# Patient Record
Sex: MALE | Race: WHITE | NOT HISPANIC OR LATINO | URBAN - METROPOLITAN AREA
[De-identification: names, ages, dates, MRNs, and addresses within clinical notes are randomized per-mention and may not be internally consistent; named-entity substitution may affect disease eponyms.]

---

## 2018-02-06 VITALS
DIASTOLIC BLOOD PRESSURE: 84 MMHG | SYSTOLIC BLOOD PRESSURE: 131 MMHG | HEART RATE: 93 BPM | HEIGHT: 67 IN | OXYGEN SATURATION: 96 % | WEIGHT: 216.93 LBS | RESPIRATION RATE: 18 BRPM

## 2018-02-06 RX ORDER — CHLORHEXIDINE GLUCONATE 213 G/1000ML
1 SOLUTION TOPICAL ONCE
Qty: 0 | Refills: 0 | Status: DISCONTINUED | OUTPATIENT
Start: 2018-02-07 | End: 2018-02-07

## 2018-02-06 NOTE — H&P ADULT - ASSESSMENT
72 y.o Male with PMHx of HTN, Hyperlipidemia, NIDDM, DG on CPAP @ nightly, recently dx'd with Afib in 12/2017 started on Xarelto (last dose on 02/04/18), who was subsequently referred to  Dr. Hooks  for further cardiac workup  and underwent an abnormal stress Echo.  He presents today for recommended Cardiac Cath with possible intervention if clinically indicated to r/o an ischemic etiology of new onset Afib.        ASA:     Mallampati: 72 yr old M with PMHx of HTN, Hyperlipidemia, NIDDM, DG on CPAP, recently dx'd with Afib in 12/2017 started on Xarelto (last dose on 02/04/18), who was subsequently referred to  Dr. Hooks  for further cardiac workup  and underwent an abnormal stress Echo.  He presents today for recommended Cardiac Cath with possible intervention if clinically indicated to r/o an ischemic etiology of new onset Afib.        ASA: III    Mallampati: II           Risks & benefits of procedure and alternative therapy have been explained to the patient including but not limited to: allergic reaction, bleeding w/possible need for blood transfusion, infection, renal and vascular compromise, limb damage, arrhythmia, stroke, vessel dissection/perforation, Myocardial infarction, emergent CABG. Informed consent obtained and in chart.

## 2018-02-06 NOTE — H&P ADULT - HISTORY OF PRESENT ILLNESS
72 y.o Male with PMHx of HTN, Hyperlipidemia,         who presented to Dr. Burkett c/o ***HISTORY OBTAINED FROM PATIENT"S WIFE ALONDRA BELLE -RELIABLE HISTORIAN     72 y.o Male with PMHx of HTN, Hyperlipidemia, NIDDM, GD on CPAP @ nightly, recently dx'd with Afib in 12/2017  started on Xarelto (last dose on 02/04/18), who presented to Dr. Hooks  for  cardiac workup for  new onset  Afib.  Pt's wife reports her  has been felling well.  He is able to walk "several" blocks without any limitations or any anginal symptoms.  He denies experiencing any CP,  SOB, palpitations, dizziness, syncope, recent PND/orthopnea, LE edema, or decline in his overall exercise tolerance.   Stress Echo performed on 01/06/18 revealed inferolateral ischemia, LVEF of 60-65%.    Per MD note, pt reported marked ORTA  on the treadmill and could only walk less than 3 minutes before having to stop due to marked dyspnea and chest pressure.      In light of pt's risk factors, above CCS Anginal Class 2 symptoms, and an abnormal Stress Echo, pt is now referred to [facility] for recommended Cardiac Cath with possible intervention if clinically indicated to  r/o suspected underlying CAD. ***HISTORY OBTAINED FROM PATIENT"S WIFE ALONDRA BELLE -RELIABLE HISTORIAN     72 y.o Male with PMHx of HTN, Hyperlipidemia, NIDDM, DG on CPAP @ nightly, recently dx'd with Afib in 12/2017 started on Xarelto (last dose on 02/04/18), who was subsequently referred to  Dr. Hooks  for cardiac workup for  new onset  Afib.  Pt's wife reports her  has been felling well.  He is able to walk "several" blocks without any limitations or any anginal symptoms.  He denies experiencing any CP,  SOB, palpitations, dizziness, syncope, recent PND/orthopnea, LE edema, or decline in his overall exercise tolerance.   Stress Echo performed on 01/06/18 revealed inferolateral ischemia, LVEF of 60-65%.    Per MD note, pt reported marked ORTA  on the treadmill and could only walk less than 3 minutes before having to stop due to marked dyspnea and chest pressure.      In light of pt's risk factors, above CCS Anginal Class 2 symptoms, and an abnormal Stress Echo, pt is now referred to [facility] for recommended Cardiac Cath with possible intervention if clinically indicated to  r/o ischemic  etiology of new onset Afib. ***HISTORY OBTAINED FROM PATIENT"S WIFE ALONDRA BELLE -RELIABLE HISTORIAN     72 y.o Male with PMHx of HTN, Hyperlipidemia, NIDDM, DG on CPAP @ nightly, recently dx'd with Afib in 12/2017 started on Xarelto (last dose on 02/04/18), who was subsequently referred to  Dr. Hooks  for cardiac workup for  new onset  Afib.  Pt's wife reports her  has been felling well.  He is able to walk "several" blocks without any limitations or any anginal symptoms.  He denies experiencing any CP,  SOB, palpitations, dizziness, syncope, recent PND/orthopnea, LE edema, or decline in his overall exercise tolerance.   Stress Echo performed on 01/06/18 revealed inferolateral ischemia, LVEF of 60-65%.    Per MD note, pt reported marked ORTA  on the treadmill and could only walk less than 3 minutes before having to stop due to marked dyspnea and chest pressure.      In light of pt's risk factors, above CCS Anginal Class 2 symptoms, and an abnormal Stress Echo, pt is now referred to [facility] for recommended Cardiac Cath with possible intervention if clinically indicated to  r/o ischemic  etiology of new onset Afib.

## 2018-02-07 ENCOUNTER — OUTPATIENT (OUTPATIENT)
Dept: OUTPATIENT SERVICES | Facility: HOSPITAL | Age: 73
LOS: 1 days | Discharge: NURSING, REHAB OR LONG-TERM CARE | End: 2018-02-07
Payer: MEDICARE

## 2018-02-07 DIAGNOSIS — Z98.890 OTHER SPECIFIED POSTPROCEDURAL STATES: Chronic | ICD-10-CM

## 2018-02-07 DIAGNOSIS — I20.9 ANGINA PECTORIS, UNSPECIFIED: ICD-10-CM

## 2018-02-07 LAB
ALBUMIN SERPL ELPH-MCNC: 4.3 G/DL — SIGNIFICANT CHANGE UP (ref 3.3–5)
ALP SERPL-CCNC: 40 U/L — SIGNIFICANT CHANGE UP (ref 40–120)
ALT FLD-CCNC: 22 U/L — SIGNIFICANT CHANGE UP (ref 10–45)
ANION GAP SERPL CALC-SCNC: 14 MMOL/L — SIGNIFICANT CHANGE UP (ref 5–17)
APTT BLD: 28.6 SEC — SIGNIFICANT CHANGE UP (ref 27.5–37.4)
AST SERPL-CCNC: 21 U/L — SIGNIFICANT CHANGE UP (ref 10–40)
BASOPHILS NFR BLD AUTO: 0.4 % — SIGNIFICANT CHANGE UP (ref 0–2)
BILIRUB SERPL-MCNC: 0.4 MG/DL — SIGNIFICANT CHANGE UP (ref 0.2–1.2)
BUN SERPL-MCNC: 16 MG/DL — SIGNIFICANT CHANGE UP (ref 7–23)
CALCIUM SERPL-MCNC: 9.5 MG/DL — SIGNIFICANT CHANGE UP (ref 8.4–10.5)
CHLORIDE SERPL-SCNC: 98 MMOL/L — SIGNIFICANT CHANGE UP (ref 96–108)
CHOLEST SERPL-MCNC: 140 MG/DL — SIGNIFICANT CHANGE UP (ref 10–199)
CK MB CFR SERPL CALC: 2.4 NG/ML — SIGNIFICANT CHANGE UP (ref 0–6.7)
CO2 SERPL-SCNC: 26 MMOL/L — SIGNIFICANT CHANGE UP (ref 22–31)
CREAT SERPL-MCNC: 1.15 MG/DL — SIGNIFICANT CHANGE UP (ref 0.5–1.3)
CRP SERPL-MCNC: 0.18 MG/DL — SIGNIFICANT CHANGE UP (ref 0–0.4)
EOSINOPHIL NFR BLD AUTO: 3.1 % — SIGNIFICANT CHANGE UP (ref 0–6)
GLUCOSE SERPL-MCNC: 137 MG/DL — HIGH (ref 70–99)
HBA1C BLD-MCNC: 7.2 % — HIGH (ref 4–5.6)
HCT VFR BLD CALC: 40.7 % — SIGNIFICANT CHANGE UP (ref 39–50)
HDLC SERPL-MCNC: 48 MG/DL — SIGNIFICANT CHANGE UP (ref 40–125)
HGB BLD-MCNC: 13.7 G/DL — SIGNIFICANT CHANGE UP (ref 13–17)
INR BLD: 0.93 — SIGNIFICANT CHANGE UP (ref 0.88–1.16)
LIPID PNL WITH DIRECT LDL SERPL: 80 MG/DL — SIGNIFICANT CHANGE UP
LYMPHOCYTES # BLD AUTO: 30.4 % — SIGNIFICANT CHANGE UP (ref 13–44)
MCHC RBC-ENTMCNC: 30.9 PG — SIGNIFICANT CHANGE UP (ref 27–34)
MCHC RBC-ENTMCNC: 33.7 G/DL — SIGNIFICANT CHANGE UP (ref 32–36)
MCV RBC AUTO: 91.9 FL — SIGNIFICANT CHANGE UP (ref 80–100)
MONOCYTES NFR BLD AUTO: 5.9 % — SIGNIFICANT CHANGE UP (ref 2–14)
NEUTROPHILS NFR BLD AUTO: 60.2 % — SIGNIFICANT CHANGE UP (ref 43–77)
PLATELET # BLD AUTO: 237 K/UL — SIGNIFICANT CHANGE UP (ref 150–400)
POTASSIUM SERPL-MCNC: 4.4 MMOL/L — SIGNIFICANT CHANGE UP (ref 3.5–5.3)
POTASSIUM SERPL-SCNC: 4.4 MMOL/L — SIGNIFICANT CHANGE UP (ref 3.5–5.3)
PROT SERPL-MCNC: 7.2 G/DL — SIGNIFICANT CHANGE UP (ref 6–8.3)
PROTHROM AB SERPL-ACNC: 10.3 SEC — SIGNIFICANT CHANGE UP (ref 9.8–12.7)
RBC # BLD: 4.43 M/UL — SIGNIFICANT CHANGE UP (ref 4.2–5.8)
RBC # FLD: 12.7 % — SIGNIFICANT CHANGE UP (ref 10.3–16.9)
SODIUM SERPL-SCNC: 138 MMOL/L — SIGNIFICANT CHANGE UP (ref 135–145)
TOTAL CHOLESTEROL/HDL RATIO MEASUREMENT: 2.9 RATIO — LOW (ref 3.4–9.6)
TRIGL SERPL-MCNC: 58 MG/DL — SIGNIFICANT CHANGE UP (ref 10–149)
WBC # BLD: 7.2 K/UL — SIGNIFICANT CHANGE UP (ref 3.8–10.5)
WBC # FLD AUTO: 7.2 K/UL — SIGNIFICANT CHANGE UP (ref 3.8–10.5)

## 2018-02-07 PROCEDURE — 93005 ELECTROCARDIOGRAM TRACING: CPT

## 2018-02-07 PROCEDURE — 83036 HEMOGLOBIN GLYCOSYLATED A1C: CPT

## 2018-02-07 PROCEDURE — 93458 L HRT ARTERY/VENTRICLE ANGIO: CPT

## 2018-02-07 PROCEDURE — 80053 COMPREHEN METABOLIC PANEL: CPT

## 2018-02-07 PROCEDURE — 82550 ASSAY OF CK (CPK): CPT

## 2018-02-07 PROCEDURE — 85730 THROMBOPLASTIN TIME PARTIAL: CPT

## 2018-02-07 PROCEDURE — 80061 LIPID PANEL: CPT

## 2018-02-07 PROCEDURE — 86140 C-REACTIVE PROTEIN: CPT

## 2018-02-07 PROCEDURE — 36415 COLL VENOUS BLD VENIPUNCTURE: CPT

## 2018-02-07 PROCEDURE — 85610 PROTHROMBIN TIME: CPT

## 2018-02-07 PROCEDURE — C1887: CPT

## 2018-02-07 PROCEDURE — C1769: CPT

## 2018-02-07 PROCEDURE — 93010 ELECTROCARDIOGRAM REPORT: CPT

## 2018-02-07 PROCEDURE — 82962 GLUCOSE BLOOD TEST: CPT

## 2018-02-07 PROCEDURE — 82553 CREATINE MB FRACTION: CPT

## 2018-02-07 PROCEDURE — 85025 COMPLETE CBC W/AUTO DIFF WBC: CPT

## 2018-02-07 RX ORDER — SODIUM CHLORIDE 9 MG/ML
1000 INJECTION INTRAMUSCULAR; INTRAVENOUS; SUBCUTANEOUS
Qty: 0 | Refills: 0 | Status: DISCONTINUED | OUTPATIENT
Start: 2018-02-07 | End: 2018-02-07

## 2018-02-07 RX ORDER — SODIUM CHLORIDE 9 MG/ML
1000 INJECTION, SOLUTION INTRAVENOUS
Qty: 0 | Refills: 0 | Status: DISCONTINUED | OUTPATIENT
Start: 2018-02-07 | End: 2018-02-07

## 2018-02-07 RX ORDER — CLOPIDOGREL BISULFATE 75 MG/1
600 TABLET, FILM COATED ORAL ONCE
Qty: 0 | Refills: 0 | Status: DISCONTINUED | OUTPATIENT
Start: 2018-02-07 | End: 2018-02-07

## 2018-02-07 RX ORDER — SODIUM CHLORIDE 9 MG/ML
500 INJECTION INTRAMUSCULAR; INTRAVENOUS; SUBCUTANEOUS
Qty: 0 | Refills: 0 | Status: DISCONTINUED | OUTPATIENT
Start: 2018-02-07 | End: 2018-02-07

## 2018-02-07 RX ORDER — DEXTROSE 50 % IN WATER 50 %
1 SYRINGE (ML) INTRAVENOUS ONCE
Qty: 0 | Refills: 0 | Status: DISCONTINUED | OUTPATIENT
Start: 2018-02-07 | End: 2018-02-07

## 2018-02-07 RX ORDER — GLUCAGON INJECTION, SOLUTION 0.5 MG/.1ML
1 INJECTION, SOLUTION SUBCUTANEOUS ONCE
Qty: 0 | Refills: 0 | Status: DISCONTINUED | OUTPATIENT
Start: 2018-02-07 | End: 2018-02-07

## 2018-02-07 RX ORDER — INSULIN LISPRO 100/ML
VIAL (ML) SUBCUTANEOUS ONCE
Qty: 0 | Refills: 0 | Status: DISCONTINUED | OUTPATIENT
Start: 2018-02-07 | End: 2018-02-07

## 2018-02-07 RX ORDER — ASPIRIN/CALCIUM CARB/MAGNESIUM 324 MG
325 TABLET ORAL ONCE
Qty: 0 | Refills: 0 | Status: DISCONTINUED | OUTPATIENT
Start: 2018-02-07 | End: 2018-02-07

## 2018-02-07 RX ORDER — DEXTROSE 50 % IN WATER 50 %
12.5 SYRINGE (ML) INTRAVENOUS ONCE
Qty: 0 | Refills: 0 | Status: DISCONTINUED | OUTPATIENT
Start: 2018-02-07 | End: 2018-02-07

## 2018-02-07 RX ORDER — DEXTROSE 50 % IN WATER 50 %
25 SYRINGE (ML) INTRAVENOUS ONCE
Qty: 0 | Refills: 0 | Status: DISCONTINUED | OUTPATIENT
Start: 2018-02-07 | End: 2018-02-07

## 2018-02-07 RX ADMIN — SODIUM CHLORIDE 75 MILLILITER(S): 9 INJECTION INTRAMUSCULAR; INTRAVENOUS; SUBCUTANEOUS at 11:35

## 2018-02-07 NOTE — PROGRESS NOTE ADULT - SUBJECTIVE AND OBJECTIVE BOX
Interventional Cardiology PA SDA Discharge Note    No complaints.   Afebrile, VSS    Ext:    R Radial:   no hematoma, no bleeding, radial pulse 1+, radial band in place    A/P: 71 y/o M w/ PMHX HTN, HLD, DM, DG on CPAP, Afib on Xarelto, w/ abnormal stress echo and CCS Angina Class 2 Sx     Pt s/p cardiac cath today: non-obstructive CAD, EF 55%     Resume Xarelto on 2/8/18    Stable for discharge as per attending Dr. Hooks after bed rest, pt voids, wrist stable, and 30 min. after ambulation.  Follow-up with Dr. Hooks in 1 week  Discharge forms signed and copies in chart

## 2019-06-03 PROBLEM — I10 ESSENTIAL (PRIMARY) HYPERTENSION: Chronic | Status: ACTIVE | Noted: 2018-02-06

## 2019-06-03 PROBLEM — E11.9 TYPE 2 DIABETES MELLITUS WITHOUT COMPLICATIONS: Chronic | Status: ACTIVE | Noted: 2018-02-06

## 2019-06-03 PROBLEM — I48.91 UNSPECIFIED ATRIAL FIBRILLATION: Chronic | Status: ACTIVE | Noted: 2018-02-06

## 2019-07-07 PROBLEM — Z00.00 ENCOUNTER FOR PREVENTIVE HEALTH EXAMINATION: Status: ACTIVE | Noted: 2019-07-07

## 2019-11-15 ENCOUNTER — APPOINTMENT (OUTPATIENT)
Dept: HEART AND VASCULAR | Facility: CLINIC | Age: 74
End: 2019-11-15
Payer: MEDICARE

## 2019-11-15 ENCOUNTER — NON-APPOINTMENT (OUTPATIENT)
Age: 74
End: 2019-11-15

## 2019-11-15 VITALS
HEART RATE: 74 BPM | WEIGHT: 216 LBS | BODY MASS INDEX: 33.9 KG/M2 | HEIGHT: 67 IN | DIASTOLIC BLOOD PRESSURE: 68 MMHG | SYSTOLIC BLOOD PRESSURE: 135 MMHG

## 2019-11-15 PROCEDURE — 93000 ELECTROCARDIOGRAM COMPLETE: CPT

## 2019-11-15 PROCEDURE — 99205 OFFICE O/P NEW HI 60 MIN: CPT | Mod: 25

## 2019-11-15 RX ORDER — INSULIN DEGLUDEC INJECTION 100 U/ML
100 INJECTION, SOLUTION SUBCUTANEOUS DAILY
Qty: 1 | Refills: 0 | Status: ACTIVE | COMMUNITY
Start: 2019-11-15

## 2019-11-15 RX ORDER — METFORMIN HYDROCHLORIDE 850 MG/1
850 TABLET, COATED ORAL TWICE DAILY
Qty: 180 | Refills: 0 | Status: ACTIVE | COMMUNITY
Start: 2019-11-15

## 2019-11-15 RX ORDER — RIVAROXABAN 20 MG/1
20 TABLET, FILM COATED ORAL
Qty: 30 | Refills: 7 | Status: ACTIVE | COMMUNITY
Start: 2019-11-15

## 2019-11-15 RX ORDER — AMLODIPINE BESYLATE 10 MG/1
10 TABLET ORAL
Qty: 90 | Refills: 3 | Status: ACTIVE | COMMUNITY
Start: 2019-11-15

## 2019-11-15 RX ORDER — QUINAPRIL HYDROCHLORIDE 10 MG/1
10 TABLET, FILM COATED ORAL DAILY
Qty: 90 | Refills: 0 | Status: ACTIVE | COMMUNITY
Start: 2019-11-15

## 2019-11-15 NOTE — PHYSICAL EXAM
[General Appearance - Well Developed] : well developed [Well Groomed] : well groomed [Normal Appearance] : normal appearance [General Appearance - Well Nourished] : well nourished [No Deformities] : no deformities [General Appearance - In No Acute Distress] : no acute distress [Normal Conjunctiva] : the conjunctiva exhibited no abnormalities [Normal Oral Mucosa] : normal oral mucosa [No Oral Pallor] : no oral pallor [No Oral Cyanosis] : no oral cyanosis [Normal Jugular Venous A Waves Present] : normal jugular venous A waves present [Normal Jugular Venous V Waves Present] : normal jugular venous V waves present [No Jugular Venous Nugent A Waves] : no jugular venous nugent A waves [Normal] : normal [5th Left ICS - MCL] : palpated at the 5th LICS in the midclavicular line [Normal Rate] : normal [Normal S1] : normal S1 [Rhythm Regular] : regular [Normal S2] : normal S2 [No Pitting Edema] : no pitting edema present [Respiration, Rhythm And Depth] : normal respiratory rhythm and effort [Exaggerated Use Of Accessory Muscles For Inspiration] : no accessory muscle use [Auscultation Breath Sounds / Voice Sounds] : lungs were clear to auscultation bilaterally [Abdomen Soft] : soft [Abdomen Tenderness] : non-tender [Abdomen Mass (___ Cm)] : no abdominal mass palpated [Abnormal Walk] : normal gait [Gait - Sufficient For Exercise Testing] : the gait was sufficient for exercise testing [Cyanosis, Localized] : no localized cyanosis [Nail Clubbing] : no clubbing of the fingernails [Petechial Hemorrhages (___cm)] : no petechial hemorrhages [] : no rash [Skin Color & Pigmentation] : normal skin color and pigmentation [No Venous Stasis] : no venous stasis [Skin Lesions] : no skin lesions [No Skin Ulcers] : no skin ulcer [No Xanthoma] : no  xanthoma was observed [Affect] : the affect was normal [Oriented To Time, Place, And Person] : oriented to person, place, and time [Mood] : the mood was normal [No Anxiety] : not feeling anxious

## 2019-11-15 NOTE — REASON FOR VISIT
Cardiac [Initial Evaluation] : an initial evaluation of [Atrial Fibrillation] : atrial fibrillation [Spouse] : spouse [FreeTextEntry1] : 74 y.o. male with pmhx significant for Type 2 DM, HTN, DG (on CPAP) and atrial arrhythmia (possibly AFL) sent by Dr. Hooks for ablation evaluation. \par \par Pt presents with wife (who is a nurse). States that the arrhythmia was found incidentally on EKG. He denies palpitations, c/p, sob, sob on exertion, lightheadedness, and syncope. He was started on Xarelto 20mg daily, which he has been taking without bleeding issues. No recent echo or cardiac monitor. No EKG available for review. EKG today shows NSR.

## 2021-02-27 NOTE — H&P ADULT - AS O2 DELIVERY
I will STOP taking the medications listed below when I get home from the hospital:    valACYclovir 500 mg oral tablet  -- 1 tab(s) by mouth once a day  
room air

## 2023-03-31 VITALS
HEART RATE: 74 BPM | RESPIRATION RATE: 18 BRPM | TEMPERATURE: 98 F | SYSTOLIC BLOOD PRESSURE: 134 MMHG | DIASTOLIC BLOOD PRESSURE: 64 MMHG | OXYGEN SATURATION: 98 %

## 2023-03-31 RX ORDER — CHLORHEXIDINE GLUCONATE 213 G/1000ML
1 SOLUTION TOPICAL ONCE
Refills: 0 | Status: DISCONTINUED | OUTPATIENT
Start: 2023-04-05 | End: 2023-04-19

## 2023-03-31 RX ORDER — HYDRALAZINE HCL 50 MG
1 TABLET ORAL
Refills: 0 | DISCHARGE

## 2023-03-31 RX ORDER — HYDROCHLOROTHIAZIDE 25 MG
1 TABLET ORAL
Refills: 0 | DISCHARGE

## 2023-03-31 NOTE — H&P ADULT - NSICDXPASTMEDICALHX_GEN_ALL_CORE_FT
PAST MEDICAL HISTORY:  Atrial fibrillation     Diabetes mellitus     Hypertension     Hypertension

## 2023-03-31 NOTE — H&P ADULT - NSHPLABSRESULTS_GEN_ALL_CORE
12.1   12.69 )-----------( 710      ( 05 Apr 2023 07:18 )             36.5   04-05    139  |  103  |  21  ----------------------------<  129<H>  4.5   |  26  |  1.18    Ca    9.7      05 Apr 2023 07:18  Mg     1.8     04-05    TPro  7.4  /  Alb  4.3  /  TBili  0.4  /  DBili  x   /  AST  19  /  ALT  13  /  AlkPhos  45  04-05    PT/INR - ( 05 Apr 2023 07:18 )   PT: 12.4 sec;   INR: 1.04          PTT - ( 05 Apr 2023 07:18 )  PTT:33.7 sec    CARDIAC MARKERS ( 05 Apr 2023 07:18 )  x     / x     / 85 U/L / x     / 1.3 ng/mL

## 2023-03-31 NOTE — H&P ADULT - ASSESSMENT
77 year old male  with PMHX Afib on Xarelto confirm last dose Lion 4/3), HLD, T2DM, HTN, Pt presented to their outpatient cardiologist Dr. Hooks  complaining of decreased exercise tolerance .Patient underwent ECHO in office , Patient underwent NST which was positive of ischemia with perfusion abnormality Pt denies CP/SOB, dizziness, palpitations, orthopnea/PND, leg swelling, LOC, bleeding, melena/hematochezia, fever, chills, URI symptoms, or recent illness.  In light of pts risk factors, CCS class _ anginal symptoms and abnormal NST, pt now presents to [facility] for recommended cardiac catheterization with possible intervention if clinically indicated.          Pt H+H, platelets elevated which is known and seen by a hematologist, Pt without any reports of BRBPR, hematuria, prior ICH, melena and no recent or previous GI bleed.   Loaded with:  and Plavix 600mg, last dose xarelto confirmed with patient Sunday     Pt Cr.1.18 and LVEF Normal per Dr. Hooks pre cath fluids ordered per protocol [X]250ml IV bolus over 30min, [x] 75cc x2hrs, [ ] 50cc x2hrs,       Malampatti 3  ASA 2  Pt is a Candidate for Moderate Sedation   Risks & benefits of procedure and alternative therapy have been explained to the patient including but not limited to: allergic reaction, bleeding w/possible need for blood transfusion, infection, renal and vascular compromise, limb damage, arrhythmia, stroke, vessel dissection/perforation, Myocardial infarction, emergent CABG. Informed consent obtained and in chart.       Suitable for moderate sedation.            77 year old male  with PMHX Afib on Xarelto confirm last dose Lion 4/3), HLD, T2DM, HTN, Pt presented to their outpatient cardiologist Dr. Hooks  complaining of decreased exercise tolerance .Patient underwent ECHO in office , Patient underwent NST which was positive of ischemia with perfusion abnormality Pt denies CP/SOB, dizziness, palpitations, orthopnea/PND, leg swelling, LOC, bleeding, melena/hematochezia, fever, chills, URI symptoms, or recent illness.  In light of pts risk factors, CCS class 2 anginal symptoms and abnormal NST, pt now presents to [facility] for recommended cardiac catheterization with possible intervention if clinically indicated.          Pt H+H, platelets elevated which is known and seen by a hematologist and being followed for workup outpatient for elevated WBC and platelets, Pt without any reports of BRBPR, hematuria, prior ICH, melena and no recent or previous GI bleed.   Loaded with:  and Plavix 600mg, last dose xarelto confirmed with patient Sunday     Pt Cr.1.18 and LVEF Normal per Dr. Hooks pre cath fluids ordered per protocol [X]250ml IV bolus over 30min, [x] 75cc x2hrs, [ ] 50cc x2hrs,       Malampatti 3  ASA 2  Pt is a Candidate for Moderate Sedation   Risks & benefits of procedure and alternative therapy have been explained to the patient including but not limited to: allergic reaction, bleeding w/possible need for blood transfusion, infection, renal and vascular compromise, limb damage, arrhythmia, stroke, vessel dissection/perforation, Myocardial infarction, emergent CABG. Informed consent obtained and in chart.       Suitable for moderate sedation.

## 2023-03-31 NOTE — H&P ADULT - HISTORY OF PRESENT ILLNESS
Cardio: Dr. Hooks   Pharmacy:  COVID:  Escort:    **Pending NST and ECHO from Dr. Hooks office [place]     77 year old male  with PMHX Afib on Xarelto confirm last dose __), HLD, T2DM, HTN, Carotid artery stenosis,  Pt presented to their outpatient cardiologist Dr. Hooks  complaining of ______ .Patient underwent ECHO with____, Patient underwent NST  with  ________ Pt denies CP/SOB, dizziness, palpitations, orthopnea/PND, leg swelling, LOC, bleeding, melena/hematochezia, fever, chills, URI symptoms, or recent illness.  In light of pts risk factors, CCS class _ anginal symptoms and abnormal NST, pt now presents to [facility] for recommended cardiac catheterization with possible intervention if clinically indicated.     Cardio: Dr. Hooks   Pharmacy:  COVID:  Escort:     77 year old male  with PMHX Afib on Xarelto confirm last dose Lion 4/3), HLD, T2DM, HTN, Pt presented to their outpatient cardiologist Dr. Hooks  complaining of decreased exercise tolerance .Patient underwent ECHO in office , Patient underwent NST which was positive of ischemia with perfusion abnormality Pt denies CP/SOB, dizziness, palpitations, orthopnea/PND, leg swelling, LOC, bleeding, melena/hematochezia, fever, chills, URI symptoms, or recent illness.  In light of pts risk factors, CCS class _ anginal symptoms and abnormal NST, pt now presents to [facility] for recommended cardiac catheterization with possible intervention if clinically indicated.     Cardio: Dr. Hooks   Pharmacy: [address]   COVID: 4/4 Negative HIE  Escort: Wife      77 year old male  with PMHX Afib on Xarelto confirm last dose Lion 4/3), HLD, T2DM, HTN, Pt presented to their outpatient cardiologist Dr. Hooks  complaining of decreased exercise tolerance .Patient underwent ECHO in office which per MD was normal no concern of valve disease, Patient underwent NST which was positive of ischemia with perfusion abnormality per MD. Pt denies CP/SOB, dizziness, palpitations, orthopnea/PND, leg swelling, LOC, bleeding, melena/hematochezia, fever, chills, URI symptoms, or recent illness.  In light of pts risk factors, CCS class 2 anginal symptoms and abnormal NST, pt now presents to [facility] for recommended cardiac catheterization with possible intervention if clinically indicated.

## 2023-04-05 ENCOUNTER — OUTPATIENT (OUTPATIENT)
Dept: OUTPATIENT SERVICES | Facility: HOSPITAL | Age: 78
LOS: 1 days | Discharge: HOME | End: 2023-04-05
Payer: MEDICARE

## 2023-04-05 DIAGNOSIS — Z98.890 OTHER SPECIFIED POSTPROCEDURAL STATES: Chronic | ICD-10-CM

## 2023-04-05 LAB
A1C WITH ESTIMATED AVERAGE GLUCOSE RESULT: 6.1 % — HIGH (ref 4–5.6)
ALBUMIN SERPL ELPH-MCNC: 4.3 G/DL — SIGNIFICANT CHANGE UP (ref 3.3–5)
ALP SERPL-CCNC: 45 U/L — SIGNIFICANT CHANGE UP (ref 40–120)
ALT FLD-CCNC: 13 U/L — SIGNIFICANT CHANGE UP (ref 10–45)
ANION GAP SERPL CALC-SCNC: 10 MMOL/L — SIGNIFICANT CHANGE UP (ref 5–17)
ANISOCYTOSIS BLD QL: SIGNIFICANT CHANGE UP
APTT BLD: 33.7 SEC — SIGNIFICANT CHANGE UP (ref 27.5–35.5)
AST SERPL-CCNC: 19 U/L — SIGNIFICANT CHANGE UP (ref 10–40)
BASE EXCESS BLDV CALC-SCNC: 1.5 MMOL/L — SIGNIFICANT CHANGE UP (ref -2–3)
BASOPHILS # BLD AUTO: 0.23 K/UL — HIGH (ref 0–0.2)
BASOPHILS NFR BLD AUTO: 1.8 % — SIGNIFICANT CHANGE UP (ref 0–2)
BILIRUB SERPL-MCNC: 0.4 MG/DL — SIGNIFICANT CHANGE UP (ref 0.2–1.2)
BUN SERPL-MCNC: 21 MG/DL — SIGNIFICANT CHANGE UP (ref 7–23)
CA-I SERPL-SCNC: 1.25 MMOL/L — SIGNIFICANT CHANGE UP (ref 1.15–1.33)
CALCIUM SERPL-MCNC: 9.7 MG/DL — SIGNIFICANT CHANGE UP (ref 8.4–10.5)
CHLORIDE SERPL-SCNC: 103 MMOL/L — SIGNIFICANT CHANGE UP (ref 96–108)
CHOLEST SERPL-MCNC: 110 MG/DL — SIGNIFICANT CHANGE UP
CK MB CFR SERPL CALC: 1.3 NG/ML — SIGNIFICANT CHANGE UP (ref 0–6.7)
CK SERPL-CCNC: 85 U/L — SIGNIFICANT CHANGE UP (ref 30–200)
CO2 BLDV-SCNC: 27.9 MMOL/L — HIGH (ref 22–26)
CO2 SERPL-SCNC: 26 MMOL/L — SIGNIFICANT CHANGE UP (ref 22–31)
COHGB MFR BLDV: 1.1 % — SIGNIFICANT CHANGE UP
CREAT SERPL-MCNC: 1.18 MG/DL — SIGNIFICANT CHANGE UP (ref 0.5–1.3)
EGFR: 63 ML/MIN/1.73M2 — SIGNIFICANT CHANGE UP
EOSINOPHIL # BLD AUTO: 0.77 K/UL — HIGH (ref 0–0.5)
EOSINOPHIL NFR BLD AUTO: 6.1 % — HIGH (ref 0–6)
ESTIMATED AVERAGE GLUCOSE: 128 MG/DL — HIGH (ref 68–114)
GAS PNL BLDV: 136 MMOL/L — SIGNIFICANT CHANGE UP (ref 136–145)
GIANT PLATELETS BLD QL SMEAR: PRESENT — SIGNIFICANT CHANGE UP
GLUCOSE BLDC GLUCOMTR-MCNC: 113 MG/DL — HIGH (ref 70–99)
GLUCOSE BLDC GLUCOMTR-MCNC: 131 MG/DL — HIGH (ref 70–99)
GLUCOSE BLDV-MCNC: 120 MG/DL — HIGH (ref 70–99)
GLUCOSE SERPL-MCNC: 129 MG/DL — HIGH (ref 70–99)
HCO3 BLDV-SCNC: 27 MMOL/L — SIGNIFICANT CHANGE UP (ref 22–29)
HCT VFR BLD CALC: 36.5 % — LOW (ref 39–50)
HCT VFR BLDA CALC: 37 % — SIGNIFICANT CHANGE UP
HDLC SERPL-MCNC: 47 MG/DL — SIGNIFICANT CHANGE UP
HGB BLD CALC-MCNC: 12.4 G/DL — LOW (ref 12.6–17.4)
HGB BLD-MCNC: 12.1 G/DL — LOW (ref 13–17)
INR BLD: 1.04 — SIGNIFICANT CHANGE UP (ref 0.88–1.16)
ISTAT INR: 1.1 — SIGNIFICANT CHANGE UP (ref 0.88–1.16)
ISTAT PT: 13.2 SEC — HIGH (ref 10–12.9)
LIPID PNL WITH DIRECT LDL SERPL: 52 MG/DL — SIGNIFICANT CHANGE UP
LYMPHOCYTES # BLD AUTO: 1.78 K/UL — SIGNIFICANT CHANGE UP (ref 1–3.3)
LYMPHOCYTES # BLD AUTO: 14 % — SIGNIFICANT CHANGE UP (ref 13–44)
MACROCYTES BLD QL: SIGNIFICANT CHANGE UP
MAGNESIUM SERPL-MCNC: 1.8 MG/DL — SIGNIFICANT CHANGE UP (ref 1.6–2.6)
MANUAL SMEAR VERIFICATION: SIGNIFICANT CHANGE UP
MCHC RBC-ENTMCNC: 33.2 GM/DL — SIGNIFICANT CHANGE UP (ref 32–36)
MCHC RBC-ENTMCNC: 36.3 PG — HIGH (ref 27–34)
MCV RBC AUTO: 109.6 FL — HIGH (ref 80–100)
METHGB MFR BLDV: 0.5 % — SIGNIFICANT CHANGE UP
MONOCYTES # BLD AUTO: 0.67 K/UL — SIGNIFICANT CHANGE UP (ref 0–0.9)
MONOCYTES NFR BLD AUTO: 5.3 % — SIGNIFICANT CHANGE UP (ref 2–14)
NEUTROPHILS # BLD AUTO: 9.24 K/UL — HIGH (ref 1.8–7.4)
NEUTROPHILS NFR BLD AUTO: 72.8 % — SIGNIFICANT CHANGE UP (ref 43–77)
NON HDL CHOLESTEROL: 63 MG/DL — SIGNIFICANT CHANGE UP
OVALOCYTES BLD QL SMEAR: SLIGHT — SIGNIFICANT CHANGE UP
PCO2 BLDV: 43 MMHG — SIGNIFICANT CHANGE UP (ref 42–55)
PH BLDV: 7.4 — SIGNIFICANT CHANGE UP (ref 7.32–7.43)
PLAT MORPH BLD: ABNORMAL
PLATELET # BLD AUTO: 710 K/UL — HIGH (ref 150–400)
PO2 BLDV: 56 MMHG — HIGH (ref 25–45)
POCT ISTAT CREATININE: 1.3 MG/DL — SIGNIFICANT CHANGE UP (ref 0.5–1.3)
POIKILOCYTOSIS BLD QL AUTO: SLIGHT — SIGNIFICANT CHANGE UP
POLYCHROMASIA BLD QL SMEAR: SLIGHT — SIGNIFICANT CHANGE UP
POTASSIUM BLDV-SCNC: 4.6 MMOL/L — SIGNIFICANT CHANGE UP (ref 3.5–5.1)
POTASSIUM SERPL-MCNC: 4.5 MMOL/L — SIGNIFICANT CHANGE UP (ref 3.5–5.3)
POTASSIUM SERPL-SCNC: 4.5 MMOL/L — SIGNIFICANT CHANGE UP (ref 3.5–5.3)
PROT SERPL-MCNC: 7.4 G/DL — SIGNIFICANT CHANGE UP (ref 6–8.3)
PROTHROM AB SERPL-ACNC: 12.4 SEC — SIGNIFICANT CHANGE UP (ref 10.5–13.4)
RBC # BLD: 3.33 M/UL — LOW (ref 4.2–5.8)
RBC # FLD: 13.4 % — SIGNIFICANT CHANGE UP (ref 10.3–14.5)
RBC BLD AUTO: ABNORMAL
SAO2 % BLDV: 90 % — HIGH (ref 67–88)
SODIUM SERPL-SCNC: 139 MMOL/L — SIGNIFICANT CHANGE UP (ref 135–145)
SPHEROCYTES BLD QL SMEAR: SLIGHT — SIGNIFICANT CHANGE UP
TRIGL SERPL-MCNC: 57 MG/DL — SIGNIFICANT CHANGE UP
WBC # BLD: 12.69 K/UL — HIGH (ref 3.8–10.5)
WBC # FLD AUTO: 12.69 K/UL — HIGH (ref 3.8–10.5)

## 2023-04-05 PROCEDURE — 93005 ELECTROCARDIOGRAM TRACING: CPT

## 2023-04-05 PROCEDURE — 82962 GLUCOSE BLOOD TEST: CPT

## 2023-04-05 PROCEDURE — C1769: CPT

## 2023-04-05 PROCEDURE — 99152 MOD SED SAME PHYS/QHP 5/>YRS: CPT

## 2023-04-05 PROCEDURE — 83735 ASSAY OF MAGNESIUM: CPT

## 2023-04-05 PROCEDURE — 83036 HEMOGLOBIN GLYCOSYLATED A1C: CPT

## 2023-04-05 PROCEDURE — C1887: CPT

## 2023-04-05 PROCEDURE — 80061 LIPID PANEL: CPT

## 2023-04-05 PROCEDURE — 85730 THROMBOPLASTIN TIME PARTIAL: CPT

## 2023-04-05 PROCEDURE — 93010 ELECTROCARDIOGRAM REPORT: CPT

## 2023-04-05 PROCEDURE — 82553 CREATINE MB FRACTION: CPT

## 2023-04-05 PROCEDURE — 82803 BLOOD GASES ANY COMBINATION: CPT

## 2023-04-05 PROCEDURE — 93458 L HRT ARTERY/VENTRICLE ANGIO: CPT | Mod: 26

## 2023-04-05 PROCEDURE — 85610 PROTHROMBIN TIME: CPT

## 2023-04-05 PROCEDURE — C1894: CPT

## 2023-04-05 PROCEDURE — 80053 COMPREHEN METABOLIC PANEL: CPT

## 2023-04-05 PROCEDURE — 93458 L HRT ARTERY/VENTRICLE ANGIO: CPT

## 2023-04-05 PROCEDURE — 85025 COMPLETE CBC W/AUTO DIFF WBC: CPT

## 2023-04-05 PROCEDURE — 82565 ASSAY OF CREATININE: CPT

## 2023-04-05 PROCEDURE — 82550 ASSAY OF CK (CPK): CPT

## 2023-04-05 RX ORDER — PILOCARPINE HCL 4 %
2 DROPS OPHTHALMIC (EYE)
Refills: 0 | DISCHARGE

## 2023-04-05 RX ORDER — HYDROXYUREA 500 MG/1
15 CAPSULE ORAL
Refills: 0 | DISCHARGE

## 2023-04-05 RX ORDER — QUINAPRIL HYDROCHLORIDE 40 MG/1
1 TABLET, FILM COATED ORAL
Qty: 0 | Refills: 0 | DISCHARGE

## 2023-04-05 RX ORDER — CLOPIDOGREL BISULFATE 75 MG/1
600 TABLET, FILM COATED ORAL ONCE
Refills: 0 | Status: COMPLETED | OUTPATIENT
Start: 2023-04-05 | End: 2023-04-05

## 2023-04-05 RX ORDER — SODIUM CHLORIDE 9 MG/ML
1000 INJECTION INTRAMUSCULAR; INTRAVENOUS; SUBCUTANEOUS
Refills: 0 | Status: DISCONTINUED | OUTPATIENT
Start: 2023-04-05 | End: 2023-04-19

## 2023-04-05 RX ORDER — INSULIN DEGLUDEC 100 U/ML
44 INJECTION, SOLUTION SUBCUTANEOUS
Qty: 0 | Refills: 0 | DISCHARGE

## 2023-04-05 RX ORDER — SODIUM CHLORIDE 9 MG/ML
250 INJECTION INTRAMUSCULAR; INTRAVENOUS; SUBCUTANEOUS ONCE
Refills: 0 | Status: DISCONTINUED | OUTPATIENT
Start: 2023-04-05 | End: 2023-04-19

## 2023-04-05 RX ORDER — AMLODIPINE BESYLATE 2.5 MG/1
1 TABLET ORAL
Refills: 0 | DISCHARGE

## 2023-04-05 RX ORDER — QUINAPRIL HYDROCHLORIDE 40 MG/1
1 TABLET, FILM COATED ORAL
Refills: 0 | DISCHARGE

## 2023-04-05 RX ORDER — ASPIRIN/CALCIUM CARB/MAGNESIUM 324 MG
325 TABLET ORAL ONCE
Refills: 0 | Status: COMPLETED | OUTPATIENT
Start: 2023-04-05 | End: 2023-04-05

## 2023-04-05 RX ORDER — RIVAROXABAN 15 MG-20MG
1 KIT ORAL
Qty: 0 | Refills: 0 | DISCHARGE

## 2023-04-05 RX ORDER — METFORMIN HYDROCHLORIDE 850 MG/1
0 TABLET ORAL
Qty: 0 | Refills: 0 | DISCHARGE

## 2023-04-05 RX ADMIN — Medication 325 MILLIGRAM(S): at 08:05

## 2023-04-05 RX ADMIN — CLOPIDOGREL BISULFATE 600 MILLIGRAM(S): 75 TABLET, FILM COATED ORAL at 08:05

## 2023-04-05 NOTE — PROGRESS NOTE ADULT - SUBJECTIVE AND OBJECTIVE BOX
Interventional Cardiology PA SDA Discharge Note    Patient without complaints. Ambulated and voided without difficulties    Afebrile, VSS    Ext: Right Radial : No hematoma, no bleeding, dressing; C/D/I    Pulses: intact RAD/DP/PT to baseline     A/P:  77 year old male with PMHX Afib on Xarelto (confirm last dose Lion 4/3), HLD, T2DM, HTN, who presented to [facility] cath lab for cardiac catheterization with possible intervention if clinically indicated in light of pts risk factors, CCS class II anginal symptoms and abnormal NST. Pt now s/p cardiac cath revealing L dominant, LM normal, LAD MLI torturous LCx MLI dominant, RCA MLI non dominant. EDP 22mmHg. Access R TR band.       1.	Stable for discharge as per attending Dr. Hooks after bed rest, pt voids, wrist stable and 30 minutes of ambulation.  2.	Follow-up with PMD/Cardiologist Dr. Hooks in 1-2 weeks  3.          Metformin held for 2 days.   4.	Discharged forms signed and copies in chart

## 2023-04-05 NOTE — PROGRESS NOTE ADULT - SUBJECTIVE AND OBJECTIVE BOX
Interventional Cardiology Radial band Removal Note    Pt without complaints.  VSS.    Right Radial access site TR in place, no hematoma, no bleed  Radial pulse: 2+    Hemostasis achieved with manual release of hemoband.    No Vasovagal reaction.    Meds given: none    Right Radial access site  no hematoma, no bleed  Radial pulse: 2+    A/P:  s/p Dx Coronary Angiogram  -	continue to monitor  -	-OOB as tolerated  -	Post Procedure Instructions given  -                   Call [phone] if any access site issues.

## 2023-04-10 DIAGNOSIS — I25.110 ATHEROSCLEROTIC HEART DISEASE OF NATIVE CORONARY ARTERY WITH UNSTABLE ANGINA PECTORIS: ICD-10-CM

## 2023-04-10 DIAGNOSIS — R94.39 ABNORMAL RESULT OF OTHER CARDIOVASCULAR FUNCTION STUDY: ICD-10-CM

## 2023-08-28 ENCOUNTER — APPOINTMENT (OUTPATIENT)
Dept: GASTROENTEROLOGY | Facility: CLINIC | Age: 78
End: 2023-08-28
Payer: MEDICARE

## 2023-08-28 VITALS
BODY MASS INDEX: 33.65 KG/M2 | HEART RATE: 78 BPM | TEMPERATURE: 97.8 F | WEIGHT: 214.38 LBS | HEIGHT: 67 IN | SYSTOLIC BLOOD PRESSURE: 126 MMHG | OXYGEN SATURATION: 94 % | RESPIRATION RATE: 14 BRPM | DIASTOLIC BLOOD PRESSURE: 60 MMHG

## 2023-08-28 DIAGNOSIS — I10 ESSENTIAL (PRIMARY) HYPERTENSION: ICD-10-CM

## 2023-08-28 DIAGNOSIS — Z79.4 TYPE 2 DIABETES MELLITUS W/OUT COMPLICATIONS: ICD-10-CM

## 2023-08-28 DIAGNOSIS — E11.9 TYPE 2 DIABETES MELLITUS W/OUT COMPLICATIONS: ICD-10-CM

## 2023-08-28 DIAGNOSIS — D64.9 ANEMIA, UNSPECIFIED: ICD-10-CM

## 2023-08-28 DIAGNOSIS — I48.20 CHRONIC ATRIAL FIBRILLATION, UNSP: ICD-10-CM

## 2023-08-28 DIAGNOSIS — Z86.79 PERSONAL HISTORY OF OTHER DISEASES OF THE CIRCULATORY SYSTEM: ICD-10-CM

## 2023-08-28 PROCEDURE — 99203 OFFICE O/P NEW LOW 30 MIN: CPT

## 2023-08-28 NOTE — HISTORY OF PRESENT ILLNESS
[FreeTextEntry1] : 79 yo male hx of CKD , hx of DM with anemia. Last colonoscopy with Dr. Sloan 8/21 with diverticulosis. Has not had endoscopy for more than 10 years  Hb 12.7 Ferritin 21, Iron sat 15% Cr. 1.75. Previously on ozempic but pt due to cost.. Has atrial  fibrillation and is on Xarelto. No n/v. No cp. Cardiologist is SYD Feng MD. Denies NSAID usage

## 2023-08-28 NOTE — ASSESSMENT
[FreeTextEntry1] : 79 yo male hx of CKD , hx of DM with anemia. Last colonoscopy with Dr. Sloan 8/21 with diverticulosis. Has not had endoscopy for more than 10 years  Hb 12.7 Ferritin 21, Iron sat 15% Cr. 1.75. Previously on ozempic but pt due to cost.. Has atrial  fibrillation and is on Xarelto. No n/v. No cp. Cardiologist is SYD Feng MD. Denies NSAID usage  IMP: 1. iron def 2. Diverticulosis 3. CKD 4 .DM PLAN He  was advised to undergo endoscopy to which he agreed. The procedure will be performed in [facility] with the assistance of an anesthesiologist. The procedure was explained in detail and he understood the risks of the procedure not limited  to infection, bleeding, perforation, risk of anesthesia and risk of IV site problems,emergency surgery, missed lesions  or non-diagnosis of stomach or esophageal  cancer.He/She]  was advised that he could not drive home alone, if the patient chooses to receive sedation. Further diagnostic and treatment recommendations will be based upon the procedure and any biopsies, if they are taken.

## 2023-08-28 NOTE — PHYSICAL EXAM

## 2023-08-28 NOTE — REVIEW OF SYSTEMS
HISTORY OF PRESENT ILLNESS:  Ms. Pitts is a 60-year-old woman, who presented to

[facility] in [place] with insidious onset that was started as a periumbilical

abdominal pain 2 days ago and is now generalized over the last 24 hours. 



She describes her pain as sharp, rated at 8/10, associated with multiple episodes of

nausea and nonbilious emesis. 



She admits to some chills, but no fever, although while in the emergency department,

she was recorded with fever. 



PAST MEDICAL HISTORY:  Significant for;

1. Chronic back pain.

2. Mitral valve prolapse.

3. Scoliosis.

4. Migraines.



PAST SURGICAL HISTORY:  Pertinent for;

1. Three back surgeries.

2. Bilateral wrist fractures that required operative intervention at different

times. 

She denies any cardiac or abdominal surgeries.



SOCIAL HISTORY:  The patient denies any cigarette smoking or illicit drug abuse.

She admits to occasional intake of ethanol in moderate amounts. 



FAMILY HISTORY:  Noncontributory for this patient's age.



CURRENT MEDICATION:  Includes;

1. Claritin 10 mg p.o. daily.

2. Flexeril 10 mg p.o. at bedtime.

3. Simvastatin 10 mg p.o. daily.

4. Zyrtec 10 mg p.o. daily.



ALLERGIES:  AMOXICILLIN, CEPHALEXIN, AND SULFA DRUGS.



REVIEW OF SYSTEMS:  Ten-point review of systems essentially unremarkable except as

stated in past medical history and chief complaint. 



PHYSICAL EXAMINATION:

GENERAL:  This reveals a 60-year-old normally developed woman, who is otherwise

coherent and interactive and appears stated age.  The patient is alert and oriented

x3.  She appears to be in acute distress secondary to severe abdominal pain. 

VITAL SIGNS:  Include blood pressure 146/88, pulse is 108, respiratory rate is 18,

temperature 100.6 degrees Fahrenheit, oxygen saturation is 98% on room air. 

HEENT:  Pupils are equal, round, reactive to light and accommodation.  She has no

jugular venous distention noted.  Sclerae are anicteric. 

HEART:  Reveals regular rate with sinus tachycardia.  No murmurs or gallops

auscultated. 

LUNGS:  Clear to auscultation bilaterally.  Her breathing is regular and nonlabored. 

ABDOMEN:  Soft, moderately distended, and diffusely tender to palpation.  Liver and

spleen otherwise nonpalpable below costal margins. 

NEUROLOGIC:  Reveals no focal deficits present.



LABORATORY FINDINGS:  Today includes CBC from bolusing with 3400 white blood cells,

hemoglobin and hematocrit 15.1 and 46.4 respectively.  Platelet count is 152,000. 



Metabolic profile; sodium 136, potassium 4.1, chloride is 100, bicarb is 23, BUN 11,

creatinine is 1.0, glucose 175, total bilirubin 0.2, AST and ALT normal at 21 and 31

respectively. 



Serum lipase is normal at 23 units/L. 



Urinalysis today is essentially unremarkable. 



PTT and INR have been ordered, results pending at time of the dictation. 



I have personally reviewed the CT scan of the abdomen and pelvis today, which is

remarkable for dilated appendix approximately 2 cm in width with multiple

appendicoliths. 



There is significant periappendiceal fat stranding and free fluid.  No significant

pneumoperitoneum is evident. 



IMPRESSION:  

1. Acute appendicitis with rupture.

2. History of chronic back pain.



RECOMMENDATIONS AND PLAN:  Laparoscopic appendectomy. 



Above findings and recommendations have been discussed with the patient in the

presence of her nurse.  I have advised the patient of the risks and benefits of the

proposed surgery to include, but not limited to, bleeding, infection, injury to

bowel or surrounding structures.  The patient indicates understanding of this

information and has granted consent for admission and surgical intervention. 







Job ID:  [id number] [Negative] : Heme/Lymph

## 2023-10-17 ENCOUNTER — APPOINTMENT (OUTPATIENT)
Dept: GASTROENTEROLOGY | Facility: AMBULATORY SURGERY CENTER | Age: 78
End: 2023-10-17
Payer: MEDICARE

## 2023-10-17 ENCOUNTER — RESULT REVIEW (OUTPATIENT)
Age: 78
End: 2023-10-17

## 2023-10-17 PROCEDURE — 43239 EGD BIOPSY SINGLE/MULTIPLE: CPT

## 2023-10-23 ENCOUNTER — NON-APPOINTMENT (OUTPATIENT)
Age: 78
End: 2023-10-23

## 2024-01-24 ENCOUNTER — APPOINTMENT (OUTPATIENT)
Dept: GASTROENTEROLOGY | Facility: CLINIC | Age: 79
End: 2024-01-24

## 2024-05-14 ENCOUNTER — APPOINTMENT (OUTPATIENT)
Dept: PODIATRY | Facility: CLINIC | Age: 79
End: 2024-05-14
Payer: MEDICARE

## 2024-05-14 DIAGNOSIS — M21.40 FLAT FOOT [PES PLANUS] (ACQUIRED), UNSPECIFIED FOOT: ICD-10-CM

## 2024-05-14 DIAGNOSIS — M79.674 PAIN IN RIGHT TOE(S): ICD-10-CM

## 2024-05-14 DIAGNOSIS — E11.49 TYPE 2 DIABETES MELLITUS WITH OTHER DIABETIC NEUROLOGICAL COMPLICATION: ICD-10-CM

## 2024-05-14 DIAGNOSIS — B35.1 TINEA UNGUIUM: ICD-10-CM

## 2024-05-14 DIAGNOSIS — M79.675 PAIN IN RIGHT TOE(S): ICD-10-CM

## 2024-05-14 PROCEDURE — 11721 DEBRIDE NAIL 6 OR MORE: CPT

## 2024-05-14 PROCEDURE — 99203 OFFICE O/P NEW LOW 30 MIN: CPT | Mod: 25

## 2024-05-14 RX ORDER — CICLOPIROX 80 MG/ML
8 SOLUTION TOPICAL
Qty: 1 | Refills: 3 | Status: ACTIVE | COMMUNITY
Start: 2024-05-14 | End: 1900-01-01

## 2024-05-14 RX ORDER — HYDROCORTISONE 1 %
12 CREAM (GRAM) TOPICAL TWICE DAILY
Qty: 1 | Refills: 2 | Status: ACTIVE | COMMUNITY
Start: 2024-05-14 | End: 1900-01-01

## 2024-05-16 PROBLEM — M21.40 FLATFOOT: Status: ACTIVE | Noted: 2024-05-15

## 2024-05-16 PROBLEM — B35.1 ONYCHOMYCOSIS: Status: ACTIVE | Noted: 2024-05-15

## 2024-05-16 PROBLEM — E11.49 DM (DIABETES MELLITUS), TYPE 2 WITH NEUROLOGICAL COMPLICATIONS: Status: ACTIVE | Noted: 2024-05-15

## 2024-05-16 PROBLEM — M79.674 PAIN IN TOES OF BOTH FEET: Status: ACTIVE | Noted: 2024-05-15

## 2024-05-16 NOTE — HISTORY OF PRESENT ILLNESS
[FreeTextEntry1] : Patient presents for at-risk foot care. He has peripheral neuropathy. He has flat feet with very dry skin and a very bad case of onychomycosis. His A1c is 6.4.

## 2024-05-16 NOTE — PHYSICAL EXAM
[1+] : left foot posterior tibialis 1+ [2+] : left foot dorsalis pedis 2+ [Vibration Dec.] : diminished vibratory sensation at the level of the toes [Position Sense Dec.] : diminished position sense at the level of the toes [Diminished Throughout Right Foot] : diminished sensation with monofilament testing throughout right foot [Diminished Throughout Left Foot] : diminished sensation with monofilament testing throughout left foot [Delayed in the Right Toes] : capillary refills normal in right toes [Delayed in the Left Toes] : capillary refills normal in the left toes [de-identified] : Fully compensated forefoot varus with flatfoot deformity. No complaints of pain in the foot other than some fasciitis with neuropathy symptoms in the arch. [FreeTextEntry1] : Patient with onychomycosis in nails 1, 2, 3, 4 and 5 on the right and 1, 3, 4 and 5 on the left. They are brown, yellow, white with distal subungual onychomycosis, dystrophy and fungus infection that is painful x9. He has very dry skin with generalized xerosis, bilateral.

## 2024-05-16 NOTE — ASSESSMENT
[FreeTextEntry1] : Impression: Pain. Onychomycosis. Diabetic neuropathy. Flatfoot.  Treatment: I manually and mechanically debrided mycotic nails x9 using a small straight nail splitter and rotary barbara. The nails were aggressively debrided and debulked to make them comfortable in shoe gear. I ePrescribed Ciclopirox to treat the fungus infection and Ammonium Lactate to moisturize. I discussed diabetic neuropathy and diabetic foot care. Shoes are orthopedic and adequate.

## 2024-09-10 ENCOUNTER — APPOINTMENT (OUTPATIENT)
Dept: PODIATRY | Facility: CLINIC | Age: 79
End: 2024-09-10
Payer: MEDICARE

## 2024-09-10 DIAGNOSIS — B35.3 TINEA PEDIS: ICD-10-CM

## 2024-09-10 PROCEDURE — 99212 OFFICE O/P EST SF 10 MIN: CPT

## 2024-09-12 PROBLEM — B35.3 TINEA PEDIS: Status: ACTIVE | Noted: 2024-09-11

## 2024-09-12 NOTE — PHYSICAL EXAM
[1+] : left foot posterior tibialis 1+ [2+] : left foot dorsalis pedis 2+ [Vibration Dec.] : diminished vibratory sensation at the level of the toes [Position Sense Dec.] : diminished position sense at the level of the toes [Diminished Throughout Right Foot] : diminished sensation with monofilament testing throughout right foot [Diminished Throughout Left Foot] : diminished sensation with monofilament testing throughout left foot [Delayed in the Right Toes] : capillary refills normal in right toes [Delayed in the Left Toes] : capillary refills normal in the left toes [FreeTextEntry1] : Onychomycotic hallux nails that are yellow, brittle, thick, dystrophic with subungual debris. Rash between the 4th and 5th toes on the right and 3rd and 4th toes on the left. There is athlete's foot with maceration, xerosis and small fissures.

## 2024-09-12 NOTE — HISTORY OF PRESENT ILLNESS
[FreeTextEntry1] : Patient presents today for diabetic foot care. His A1c is 6.4. Fasting sugar is 130. He has hallux mycotic nails, but he has a rash between the 4th and 5th toes on the right and 3rd and 4th toes on the left. There is athlete's foot with maceration, xerosis and small fissures.

## 2024-09-12 NOTE — ASSESSMENT
[FreeTextEntry1] : Impression: Tinea pedis.  Treatment: I prepped the areas, and I debrided the interspaces and cleansed them. I put a Silvadene dressing on. I ePrescribed Ketoconazole for him to use for the next 3 weeks. I manually and mechanically debrided hallux mycotic nails using a small straight nail splitter and rotary barbara. The nails were aggressively debrided and debulked to make them comfortable in shoe gear. Inspect the foot daily. Call with any issues.

## 2024-12-10 ENCOUNTER — APPOINTMENT (OUTPATIENT)
Dept: PODIATRY | Facility: CLINIC | Age: 79
End: 2024-12-10

## 2024-12-10 DIAGNOSIS — E11.49 TYPE 2 DIABETES MELLITUS WITH OTHER DIABETIC NEUROLOGICAL COMPLICATION: ICD-10-CM

## 2024-12-10 DIAGNOSIS — B35.3 TINEA PEDIS: ICD-10-CM

## 2024-12-10 PROCEDURE — 99212 OFFICE O/P EST SF 10 MIN: CPT
